# Patient Record
Sex: MALE | Race: WHITE | NOT HISPANIC OR LATINO | Employment: UNEMPLOYED | URBAN - METROPOLITAN AREA
[De-identification: names, ages, dates, MRNs, and addresses within clinical notes are randomized per-mention and may not be internally consistent; named-entity substitution may affect disease eponyms.]

---

## 2019-08-10 ENCOUNTER — HOSPITAL ENCOUNTER (EMERGENCY)
Facility: OTHER | Age: 39
Discharge: HOME | End: 2019-08-10
Attending: EMERGENCY MEDICINE

## 2019-08-10 VITALS
HEIGHT: 76 IN | WEIGHT: 195 LBS | DIASTOLIC BLOOD PRESSURE: 70 MMHG | OXYGEN SATURATION: 98 % | HEART RATE: 68 BPM | RESPIRATION RATE: 16 BRPM | SYSTOLIC BLOOD PRESSURE: 108 MMHG | TEMPERATURE: 100 F | BODY MASS INDEX: 23.75 KG/M2

## 2019-08-10 DIAGNOSIS — R05.9 COUGH: ICD-10-CM

## 2019-08-10 DIAGNOSIS — T40.1X1A ACCIDENTAL OVERDOSE OF HEROIN, INITIAL ENCOUNTER: Primary | ICD-10-CM

## 2019-08-10 LAB
ALBUMIN SERPL BCP-MCNC: 4 G/DL (ref 3.5–5.2)
ALP SERPL-CCNC: 80 U/L (ref 55–135)
ALT SERPL W/O P-5'-P-CCNC: 16 U/L (ref 10–44)
ANION GAP SERPL CALC-SCNC: 10 MMOL/L (ref 8–16)
AST SERPL-CCNC: 29 U/L (ref 10–40)
BASOPHILS # BLD AUTO: 0.05 K/UL (ref 0–0.2)
BASOPHILS NFR BLD: 0.6 % (ref 0–1.9)
BILIRUB SERPL-MCNC: 0.4 MG/DL (ref 0.1–1)
BUN SERPL-MCNC: 17 MG/DL (ref 6–20)
CALCIUM SERPL-MCNC: 9.4 MG/DL (ref 8.7–10.5)
CHLORIDE SERPL-SCNC: 104 MMOL/L (ref 95–110)
CO2 SERPL-SCNC: 27 MMOL/L (ref 23–29)
CREAT SERPL-MCNC: 1 MG/DL (ref 0.5–1.4)
DIFFERENTIAL METHOD: ABNORMAL
EOSINOPHIL # BLD AUTO: 0.7 K/UL (ref 0–0.5)
EOSINOPHIL NFR BLD: 9 % (ref 0–8)
ERYTHROCYTE [DISTWIDTH] IN BLOOD BY AUTOMATED COUNT: 14.2 % (ref 11.5–14.5)
EST. GFR  (AFRICAN AMERICAN): >60 ML/MIN/1.73 M^2
EST. GFR  (NON AFRICAN AMERICAN): >60 ML/MIN/1.73 M^2
GLUCOSE SERPL-MCNC: 86 MG/DL (ref 70–110)
HCT VFR BLD AUTO: 41.3 % (ref 40–54)
HGB BLD-MCNC: 13.6 G/DL (ref 14–18)
IMM GRANULOCYTES # BLD AUTO: 0.02 K/UL (ref 0–0.04)
IMM GRANULOCYTES NFR BLD AUTO: 0.3 % (ref 0–0.5)
LYMPHOCYTES # BLD AUTO: 2.7 K/UL (ref 1–4.8)
LYMPHOCYTES NFR BLD: 33.4 % (ref 18–48)
MCH RBC QN AUTO: 27.7 PG (ref 27–31)
MCHC RBC AUTO-ENTMCNC: 32.9 G/DL (ref 32–36)
MCV RBC AUTO: 84 FL (ref 82–98)
MONOCYTES # BLD AUTO: 0.6 K/UL (ref 0.3–1)
MONOCYTES NFR BLD: 7.4 % (ref 4–15)
NEUTROPHILS # BLD AUTO: 3.9 K/UL (ref 1.8–7.7)
NEUTROPHILS NFR BLD: 49.3 % (ref 38–73)
NRBC BLD-RTO: 0 /100 WBC
PLATELET # BLD AUTO: 362 K/UL (ref 150–350)
PMV BLD AUTO: 8.7 FL (ref 9.2–12.9)
POTASSIUM SERPL-SCNC: 4 MMOL/L (ref 3.5–5.1)
PROT SERPL-MCNC: 6.9 G/DL (ref 6–8.4)
RBC # BLD AUTO: 4.91 M/UL (ref 4.6–6.2)
SODIUM SERPL-SCNC: 141 MMOL/L (ref 136–145)
WBC # BLD AUTO: 7.99 K/UL (ref 3.9–12.7)

## 2019-08-10 PROCEDURE — 96361 HYDRATE IV INFUSION ADD-ON: CPT

## 2019-08-10 PROCEDURE — 99284 EMERGENCY DEPT VISIT MOD MDM: CPT | Mod: 25

## 2019-08-10 PROCEDURE — 63600175 PHARM REV CODE 636 W HCPCS: Performed by: EMERGENCY MEDICINE

## 2019-08-10 PROCEDURE — 85025 COMPLETE CBC W/AUTO DIFF WBC: CPT

## 2019-08-10 PROCEDURE — 96360 HYDRATION IV INFUSION INIT: CPT

## 2019-08-10 PROCEDURE — 80053 COMPREHEN METABOLIC PANEL: CPT

## 2019-08-10 PROCEDURE — 36415 COLL VENOUS BLD VENIPUNCTURE: CPT

## 2019-08-10 RX ORDER — NALOXONE HYDROCHLORIDE 4 MG/.1ML
SPRAY NASAL
Qty: 1 EACH | Refills: 1 | Status: SHIPPED | OUTPATIENT
Start: 2019-08-10 | End: 2021-01-26 | Stop reason: SDUPTHER

## 2019-08-10 RX ADMIN — SODIUM CHLORIDE 1000 ML: 0.9 INJECTION, SOLUTION INTRAVENOUS at 01:08

## 2019-08-10 NOTE — ED TRIAGE NOTES
Pt found unresponsive in car after using heroin. Pt arsouable to verbal stimuli but falls asleep easily. Maintaining airway. Pt reports injection and no inhalation. Pt frequently trying to clear throat.

## 2019-08-10 NOTE — ED PROVIDER NOTES
Encounter Date: 8/10/2019    SCRIBE #1 NOTE: I, Anurag Goss, am scribing for, and in the presence of, Dr. Palacio.       History     Chief Complaint   Patient presents with    Drug Overdose     per EMS, pt used heroine this morning. pt was found unresponsive in car by [facility]. per EMS, pt was aaox3 upon arrival to scene.      Time seen by provider: 12:34 PM    This is a 38 y.o. male w/ hx of long term IVDU who presents via EMS with complaint of AMS s/p heroin overdose. Pt states that he feels fine now. His last memory PTA was halting at a stop sign as he was driving just after using. He notes using usual dosage, but that he has recently developed increased tolerance. He uses daily. He reports associated anxiety, nausea, decreased appetite and congestion. He denies fever and cough. He was in rehab a few years ago. Pt has hx of tobacco use. Denies any other medical issues.     The history is provided by the patient.     Review of patient's allergies indicates:  No Known Allergies  No past medical history on file.  No past surgical history on file.  No family history on file.  Social History     Tobacco Use    Smoking status: Not on file   Substance Use Topics    Alcohol use: Not on file    Drug use: Not on file     Review of Systems   Constitutional: Positive for appetite change. Negative for chills and fever.   HENT: Positive for congestion. Negative for rhinorrhea and sore throat.    Eyes: Negative for visual disturbance.   Respiratory: Negative for cough and shortness of breath.    Cardiovascular: Negative for chest pain.   Gastrointestinal: Positive for nausea. Negative for abdominal pain, diarrhea and vomiting.   Genitourinary: Negative for dysuria.   Musculoskeletal: Negative for back pain.   Skin: Negative for rash.   Neurological: Negative for dizziness, weakness and light-headedness.   Psychiatric/Behavioral: Positive for confusion. The patient is nervous/anxious.        Physical Exam     Initial  Vitals [08/10/19 1127]   BP Pulse Resp Temp SpO2   (!) 124/59 78 16 99.2 °F (37.3 °C) 96 %      MAP       --         Physical Exam    Nursing note and vitals reviewed.  Constitutional: He appears well-developed and well-nourished.   Disheveled and somnolent.   HENT:   Head: Normocephalic and atraumatic.   Mouth/Throat: Oropharynx is clear and moist.   Eyes: Conjunctivae and EOM are normal. Pupils are equal, round, and reactive to light.   Neck: Normal range of motion. Neck supple.   Cardiovascular: Normal rate, regular rhythm, S1 normal, S2 normal and normal heart sounds. Exam reveals no gallop and no friction rub.    No murmur heard.  Pulmonary/Chest: Breath sounds normal. No respiratory distress. He has no wheezes. He has no rhonchi. He has no rales.   Abdominal: Soft. Bowel sounds are normal. There is no tenderness. There is no rebound and no guarding.   Musculoskeletal: Normal range of motion. He exhibits no edema or tenderness.   No lower extremity edema.    Neurological: He is alert and oriented to person, place, and time. He has normal strength. No cranial nerve deficit.   Skin: Skin is warm. Capillary refill takes less than 2 seconds. No rash noted. No pallor.   Left arm IVDU site with no sign of infection.    Psychiatric: He has a normal mood and affect. His behavior is normal. Judgment and thought content normal.         ED Course   Procedures  Labs Reviewed   CBC W/ AUTO DIFFERENTIAL - Abnormal; Notable for the following components:       Result Value    Hemoglobin 13.6 (*)     Platelets 362 (*)     MPV 8.7 (*)     Eos # 0.7 (*)     Eosinophil% 9.0 (*)     All other components within normal limits   COMPREHENSIVE METABOLIC PANEL          Imaging Results          X-Ray Chest AP Portable (Final result)  Result time 08/10/19 14:08:33    Final result by Mati De Oliveira MD (08/10/19 14:08:33)                 Impression:      No acute abnormality.      Electronically signed by: Mati De Oliveira  MD  Date:    08/10/2019  Time:    14:08             Narrative:    EXAMINATION:  XR CHEST AP PORTABLE    CLINICAL HISTORY:  Cough    TECHNIQUE:  Single frontal view of the chest was performed.    COMPARISON:  None    FINDINGS:  Lungs are clear.  No effusion or pneumothorax.    No acute bone abnormality.                                 Medical Decision Making:   History:   Old Medical Records: I decided to obtain old medical records.  Initial Assessment:       38-year-old male with history of IVDU brought by EMS after patient found unresponsive in his car.  He admits to using heroin right before this, and uses daily.  He currently has no complaints, he appears somnolent disheveled on exam but no sign of trauma or other concerning exam findings.  No sign of cellulitis of IVDU sites.  He has normal vitals and afebrile.  Per EMS, no Narcan given.    Given initial AMS, basic labs checked with no acute findings, no anemia or elevated LFTs.  He complained of cough while in ED, so chest x-ray done with no sign of pneumonia.   Patient was observed in ED for 4 hr with no recurrent somnolence and no apnea, and was ambulatory, no longer somnolent and at baseline with no complaints on reassessment.  He was extensively counseled on importance of drug cessation.  Given long-term use, he will likely require inpatient rehab or Suboxone/methadone to handle detox.  He was also given Narcan Rx in case of future overdoses.  Patient comfortable with discharge plan and return precautions.      Clinical Tests:   Lab Tests: Ordered and Reviewed  Radiological Study: Ordered and Reviewed            Scribe Attestation:   Scribe #1: I performed the above scribed service and the documentation accurately describes the services I performed. I attest to the accuracy of the note.    Attending Attestation:           Physician Attestation for Scribe:  Physician Attestation Statement for Scribe #1: I, Dr. Palacio , reviewed documentation, as scribed  by Anurag Goss  in my presence, and it is both accurate and complete.                    Clinical Impression:     1. Accidental overdose of heroin, initial encounter    2. Cough                                   Ambrocio Palacio MD  08/11/19 6076

## 2019-08-10 NOTE — ED NOTES
Pt still appears drowsy. Pt connected to continuous pulse ox and bp monitor. Pt awakens to verbal stimuli. Respirations even/unlabored. Side rails upx2, call bell within reach. Will continue to monitor.

## 2021-01-26 ENCOUNTER — HOSPITAL ENCOUNTER (EMERGENCY)
Facility: OTHER | Age: 41
Discharge: HOME | End: 2021-01-26
Attending: EMERGENCY MEDICINE

## 2021-01-26 VITALS
TEMPERATURE: 98 F | RESPIRATION RATE: 19 BRPM | SYSTOLIC BLOOD PRESSURE: 103 MMHG | WEIGHT: 200 LBS | OXYGEN SATURATION: 95 % | DIASTOLIC BLOOD PRESSURE: 57 MMHG | BODY MASS INDEX: 24.36 KG/M2 | HEIGHT: 76 IN | HEART RATE: 67 BPM

## 2021-01-26 DIAGNOSIS — T40.1X1A HEROIN OVERDOSE, ACCIDENTAL OR UNINTENTIONAL, INITIAL ENCOUNTER: Primary | ICD-10-CM

## 2021-01-26 LAB — POCT GLUCOSE: 106 MG/DL (ref 70–110)

## 2021-01-26 PROCEDURE — 99283 EMERGENCY DEPT VISIT LOW MDM: CPT | Mod: 25

## 2021-01-26 PROCEDURE — 82962 GLUCOSE BLOOD TEST: CPT

## 2021-01-26 RX ORDER — NALOXONE HYDROCHLORIDE 4 MG/.1ML
SPRAY NASAL
Qty: 1 EACH | Refills: 1 | Status: SHIPPED | OUTPATIENT
Start: 2021-01-26

## 2021-09-15 ENCOUNTER — HOSPITAL ENCOUNTER (EMERGENCY)
Facility: HOSPITAL | Age: 41
Discharge: HOME | End: 2021-09-15
Attending: EMERGENCY MEDICINE

## 2021-09-15 VITALS
WEIGHT: 200 LBS | HEART RATE: 97 BPM | HEIGHT: 76 IN | BODY MASS INDEX: 24.36 KG/M2 | RESPIRATION RATE: 18 BRPM | OXYGEN SATURATION: 96 % | TEMPERATURE: 99 F | SYSTOLIC BLOOD PRESSURE: 121 MMHG | DIASTOLIC BLOOD PRESSURE: 88 MMHG

## 2021-09-15 DIAGNOSIS — F41.9 ANXIETY: Primary | ICD-10-CM

## 2021-09-15 DIAGNOSIS — Z76.0 MEDICATION REFILL: ICD-10-CM

## 2021-09-15 PROCEDURE — 25000003 PHARM REV CODE 250: Performed by: EMERGENCY MEDICINE

## 2021-09-15 PROCEDURE — 99284 EMERGENCY DEPT VISIT MOD MDM: CPT

## 2021-09-15 RX ORDER — TRAZODONE HYDROCHLORIDE 50 MG/1
100 TABLET ORAL ONCE
Status: COMPLETED | OUTPATIENT
Start: 2021-09-15 | End: 2021-09-15

## 2021-09-15 RX ORDER — CITALOPRAM 10 MG/1
10 TABLET ORAL DAILY
Qty: 30 TABLET | Refills: 0 | Status: SHIPPED | OUTPATIENT
Start: 2021-09-15 | End: 2022-09-15

## 2021-09-15 RX ORDER — OLANZAPINE 10 MG/1
10 TABLET ORAL NIGHTLY
Qty: 30 TABLET | Refills: 0 | Status: SHIPPED | OUTPATIENT
Start: 2021-09-15 | End: 2022-09-15

## 2021-09-15 RX ORDER — TRAZODONE HYDROCHLORIDE 100 MG/1
100 TABLET ORAL NIGHTLY PRN
Qty: 30 TABLET | Refills: 0 | Status: SHIPPED | OUTPATIENT
Start: 2021-09-15 | End: 2022-09-15

## 2021-09-15 RX ORDER — OLANZAPINE 5 MG/1
10 TABLET ORAL DAILY
Status: DISCONTINUED | OUTPATIENT
Start: 2021-09-16 | End: 2021-09-15

## 2021-09-15 RX ORDER — OLANZAPINE 5 MG/1
10 TABLET, ORALLY DISINTEGRATING ORAL ONCE
Status: COMPLETED | OUTPATIENT
Start: 2021-09-15 | End: 2021-09-15

## 2021-09-15 RX ADMIN — OLANZAPINE 10 MG: 5 TABLET, ORALLY DISINTEGRATING ORAL at 10:09

## 2021-09-15 RX ADMIN — TRAZODONE HYDROCHLORIDE 100 MG: 50 TABLET ORAL at 10:09

## 2024-07-09 ENCOUNTER — HOSPITAL ENCOUNTER (EMERGENCY)
Facility: HOSPITAL | Age: 44
Discharge: LEFT BEFORE DISCHARGE | End: 2024-07-09
Attending: EMERGENCY MEDICINE

## 2024-07-09 VITALS
DIASTOLIC BLOOD PRESSURE: 80 MMHG | HEIGHT: 72 IN | OXYGEN SATURATION: 97 % | RESPIRATION RATE: 20 BRPM | WEIGHT: 175 LBS | HEART RATE: 120 BPM | SYSTOLIC BLOOD PRESSURE: 143 MMHG | TEMPERATURE: 99 F | BODY MASS INDEX: 23.7 KG/M2

## 2024-07-09 DIAGNOSIS — F15.929 METHAMPHETAMINE INTOXICATION: Primary | ICD-10-CM

## 2024-07-09 PROCEDURE — 99283 EMERGENCY DEPT VISIT LOW MDM: CPT

## 2024-07-09 NOTE — ED PROVIDER NOTES
Encounter Date: 7/9/2024       History     Chief Complaint   Patient presents with    Leg Swelling    Possible Snake Bite     Patient brought in by EMS due to telling the clinic that he was bitten by a snake.  Further clarification was that he was seeing his neck.  Patient has a history of methamphetamine use according to history      Review of patient's allergies indicates:  No Known Allergies  No past medical history on file.  No past surgical history on file.  No family history on file.  Social History     Tobacco Use    Smoking status: Former   Substance Use Topics    Alcohol use: Yes     Comment: occ    Drug use: Yes     Types: IV     Comment: heroin     Review of Systems   Psychiatric/Behavioral:  Positive for agitation, behavioral problems and hallucinations.        Physical Exam     Initial Vitals [07/09/24 1410]   BP Pulse Resp Temp SpO2   (!) 143/80 (!) 120 20 99.2 °F (37.3 °C) 97 %      MAP       --         Physical Exam    Constitutional: He appears well-developed and well-nourished. No distress.   HENT:   Head: Normocephalic and atraumatic.   Eyes: Conjunctivae are normal. Pupils are equal, round, and reactive to light.   Cardiovascular:            No JVD   Pulmonary/Chest: No respiratory distress.   Abdominal: He exhibits no distension.     Neurological: He is alert and oriented to person, place, and time.   Skin: No rash noted. No erythema.   Psychiatric:   Agitated.  No active delusions or hallucinations.         Medical Screening Exam   See Full Note    ED Course   Procedures  Labs Reviewed - No data to display       Imaging Results    None          Medications - No data to display  Medical Decision Making             ED Course as of 07/10/24 0657   Tue Jul 09, 2024   1416 Patient seen by physician general exam normal patient had said that he had a snake on him and was sent by clinic.  Patient has capacity mucous own decisions.  He was having some hallucinations.  Has a history of known drug use.   Could not keep patient against his will.  He decided to leave without further workup [PK]      ED Course User Index  [PK] Alistair Begum MD                           Clinical Impression:   Final diagnoses:  [F15.929] Methamphetamine intoxication (Primary)        ED Disposition Condition    Alistair Houser MD  07/10/24 0657

## 2024-07-09 NOTE — ED TRIAGE NOTES
Patient presents to the ED via EMS for possible snake bite. Patient went to [facility] and told them he got bit by a snake so EMS was called. Patient complains of bilateral leg pain.

## 2024-09-21 ENCOUNTER — HOSPITAL ENCOUNTER (EMERGENCY)
Facility: OTHER | Age: 44
Discharge: HOME | End: 2024-09-21
Attending: EMERGENCY MEDICINE

## 2024-09-21 VITALS
HEART RATE: 70 BPM | SYSTOLIC BLOOD PRESSURE: 122 MMHG | BODY MASS INDEX: 26.18 KG/M2 | WEIGHT: 215 LBS | OXYGEN SATURATION: 98 % | RESPIRATION RATE: 16 BRPM | TEMPERATURE: 98 F | HEIGHT: 76 IN | DIASTOLIC BLOOD PRESSURE: 79 MMHG

## 2024-09-21 DIAGNOSIS — K59.00 CONSTIPATION, UNSPECIFIED CONSTIPATION TYPE: ICD-10-CM

## 2024-09-21 DIAGNOSIS — K43.9 HERNIA OF ABDOMINAL WALL: Primary | ICD-10-CM

## 2024-09-21 PROCEDURE — 99282 EMERGENCY DEPT VISIT SF MDM: CPT

## 2024-09-21 PROCEDURE — 25000003 PHARM REV CODE 250: Performed by: EMERGENCY MEDICINE

## 2024-09-21 RX ORDER — ADHESIVE BANDAGE
2400 BANDAGE TOPICAL
Status: COMPLETED | OUTPATIENT
Start: 2024-09-21 | End: 2024-09-21

## 2024-09-21 RX ORDER — ADHESIVE BANDAGE
2400 BANDAGE TOPICAL DAILY PRN
Qty: 473 ML | Refills: 0 | Status: SHIPPED | OUTPATIENT
Start: 2024-09-21

## 2024-09-21 RX ADMIN — MAGNESIUM HYDROXIDE 2400 MG: 400 SUSPENSION ORAL at 10:09

## 2024-09-22 NOTE — DISCHARGE INSTRUCTIONS
Mr. Portillo,    Thank you for letting me care for you today! It was nice meeting you, and I hope you feel better soon.   If you would like access to your chart and what was done today please utilize the [facility].   Please come back to [facility] for all of your future medical needs.    Our goal in the emergency department is to always give you outstanding care and exceptional service. You may receive a survey by mail or e-mail in the next week regarding your experience in our ED. We would greatly appreciate you completing and returning the survey. Your feedback provides us with a way to recognize our staff who give very good care and it helps us learn how to improve when your experience was below our aspiration of excellence.     Sincerely,    Tramaine Medina MD  Board Certified Emergency Physician

## 2024-09-22 NOTE — ED PROVIDER NOTES
Encounter Date: 9/21/2024       History     Chief Complaint   Patient presents with    Abdominal Pain     C/o right lower abdominal pain, Previously diagnosed with abdominal hernia,      This is a pleasant 45 yo man that presents for evaluation of pain in the abdomen in the right lower quadrant which felt like a burning similar to previous issues related to a known inguinal hernia in the past.  He denies any injury, fevers, chills.  Notes that he is currently in a rehabilitation facility and taking Suboxone which tends to make him constipated.  He generally takes milk of magnesia when taking this medication to help with bowel movements but because he is in a rehabilitation facility at this time is not able to obtain it.    The history is provided by the patient and medical records.     Review of patient's allergies indicates:  No Known Allergies  History reviewed. No pertinent past medical history.  History reviewed. No pertinent surgical history.  No family history on file.  Social History     Tobacco Use    Smoking status: Former   Substance Use Topics    Alcohol use: Yes     Comment: occ    Drug use: Yes     Types: IV     Comment: heroin     Review of Systems  Constitutional-no fever  HEENT-no congestion  Eyes-no redness  Respiratory-no shortness of breath  Cardio-no chest pain  GI-positive abdominal pain  Endocrine-no cold intolerance  -no difficulty urinating  MSK-no myalgias  Skin-no rashes  Allergy-no environmental allergy  Neurologic-, no headache  Hematology-no swollen nodes  Behavioral-no confusion  Physical Exam     Initial Vitals [09/21/24 2134]   BP Pulse Resp Temp SpO2   128/69 76 18 98.2 °F (36.8 °C) 97 %      MAP       --         Physical Exam  Constitutional:  Uncomfortable appearing 44-year-old man in mild distress  Eyes: Conjunctivae normal.  ENT       Head: Normocephalic, atraumatic.       Nose: Normal external appearance        Mouth/Throat: no strigulous respirations    Hematological/Lymphatic/Immunilogical: no visible lymphadenopathy   Cardiovascular: Normal rate,   Respiratory: Normal respiratory effort.  Soft, no rebound, no guarding,  Gastrointestinal: non distended   Musculoskeletal: Normal range of motion in all extremities. No obvious deformities or swelling.  Neurologic: Alert, oriented. Normal speech and language. No gross focal neurologic deficits are appreciated.  Skin: Skin is warm, dry. No rash noted.  Psychiatric: Mood and affect are normal.   ED Course   Procedures  Labs Reviewed - No data to display       Imaging Results    None          Medications   magnesium hydroxide 400 mg/5 ml suspension 2,400 mg (2,400 mg Oral Given 9/21/24 2228)     Medical Decision Making  Problems Addressed:  Constipation, unspecified constipation type: acute illness or injury  Hernia of abdominal wall: chronic illness or injury with exacerbation, progression, or side effects of treatment    Amount and/or Complexity of Data Reviewed  External Data Reviewed: labs and notes.     Details: History of hernia as well as opiate abuse and methamphetamine abuse    Risk  OTC drugs.  Prescription drug management.  Decision regarding hospitalization.  Diagnosis or treatment significantly limited by social determinants of health.  Risk Details: Substance abuse complicates this patient's care is a social determinants of health                                      Clinical Impression:  Final diagnoses:  [K43.9] Hernia of abdominal wall (Primary)  [K59.00] Constipation, unspecified constipation type          ED Disposition Condition    Discharge Stable          ED Prescriptions       Medication Sig Dispense Start Date End Date Auth. Provider    magnesium hydroxide 400 mg/5 ml (MILK OF MAGNESIA) 400 mg/5 mL Susp Take 30 mLs (2,400 mg total) by mouth daily as needed (constipation). 473 mL 9/21/2024 -- Tramaine Medina MD          Follow-up Information       Follow up With Specialties Details Why  Contact Info    [facility] - Emergency Dept Emergency Medicine Go to  As needed [address] [facility] [id number] [phone]    Seymour Back Jr., MD General Surgery, Vascular Surgery Schedule an appointment as soon as possible for a visit  As needed [address] [address]  [phone]               Tramaine Medina MD  09/22/24 6216

## 2024-09-23 ENCOUNTER — TELEPHONE (OUTPATIENT)
Dept: ORTHOPEDICS | Facility: CLINIC | Age: 44
End: 2024-09-23

## 2024-12-23 ENCOUNTER — HOSPITAL ENCOUNTER (EMERGENCY)
Facility: HOSPITAL | Age: 44
Discharge: PSYCHIATRIC OR SUBSTANCE USE FACILITY | End: 2024-12-23
Attending: EMERGENCY MEDICINE
Payer: MEDICAID

## 2024-12-23 VITALS
HEART RATE: 87 BPM | TEMPERATURE: 100 F | OXYGEN SATURATION: 97 % | DIASTOLIC BLOOD PRESSURE: 66 MMHG | SYSTOLIC BLOOD PRESSURE: 142 MMHG | RESPIRATION RATE: 19 BRPM

## 2024-12-23 DIAGNOSIS — F19.94 SUBSTANCE INDUCED MOOD DISORDER: ICD-10-CM

## 2024-12-23 DIAGNOSIS — R45.851 SUICIDAL IDEATION: ICD-10-CM

## 2024-12-23 DIAGNOSIS — R00.0 TACHYCARDIA: ICD-10-CM

## 2024-12-23 DIAGNOSIS — F20.0 ACUTE EXACERBATION OF CHRONIC PARANOID SCHIZOPHRENIA: Primary | ICD-10-CM

## 2024-12-23 LAB
ALBUMIN SERPL BCP-MCNC: 4.5 G/DL (ref 3.5–5.2)
ALP SERPL-CCNC: 76 U/L (ref 40–150)
ALT SERPL W/O P-5'-P-CCNC: 13 U/L (ref 10–44)
AMPHET+METHAMPHET UR QL: ABNORMAL
ANION GAP SERPL CALC-SCNC: 14 MMOL/L (ref 8–16)
AST SERPL-CCNC: 21 U/L (ref 10–40)
BACTERIA #/AREA URNS HPF: ABNORMAL /HPF
BARBITURATES UR QL SCN>200 NG/ML: NEGATIVE
BASOPHILS # BLD AUTO: 0.02 K/UL (ref 0–0.2)
BASOPHILS NFR BLD: 0.3 % (ref 0–1.9)
BENZODIAZ UR QL SCN>200 NG/ML: ABNORMAL
BILIRUB SERPL-MCNC: 0.7 MG/DL (ref 0.1–1)
BILIRUB UR QL STRIP: ABNORMAL
BUN SERPL-MCNC: 25 MG/DL (ref 6–20)
BZE UR QL SCN: NEGATIVE
CALCIUM SERPL-MCNC: 10 MG/DL (ref 8.7–10.5)
CANNABINOIDS UR QL SCN: NEGATIVE
CHLORIDE SERPL-SCNC: 107 MMOL/L (ref 95–110)
CK SERPL-CCNC: 313 U/L (ref 20–200)
CLARITY UR: ABNORMAL
CO2 SERPL-SCNC: 20 MMOL/L (ref 23–29)
COLOR UR: YELLOW
CREAT SERPL-MCNC: 1 MG/DL (ref 0.5–1.4)
CREAT UR-MCNC: 280.9 MG/DL (ref 23–375)
DIFFERENTIAL METHOD BLD: ABNORMAL
EOSINOPHIL # BLD AUTO: 0.2 K/UL (ref 0–0.5)
EOSINOPHIL NFR BLD: 2.7 % (ref 0–8)
ERYTHROCYTE [DISTWIDTH] IN BLOOD BY AUTOMATED COUNT: 13.4 % (ref 11.5–14.5)
EST. GFR  (NO RACE VARIABLE): >60 ML/MIN/1.73 M^2
ETHANOL SERPL-MCNC: <10 MG/DL (ref 0–10)
GLUCOSE SERPL-MCNC: 90 MG/DL (ref 70–110)
GLUCOSE UR QL STRIP: NEGATIVE
HCT VFR BLD AUTO: 41.8 % (ref 40–54)
HGB BLD-MCNC: 14.6 G/DL (ref 14–18)
HGB UR QL STRIP: ABNORMAL
HYALINE CASTS #/AREA URNS LPF: 0 /LPF
IMM GRANULOCYTES # BLD AUTO: 0.01 K/UL (ref 0–0.04)
IMM GRANULOCYTES NFR BLD AUTO: 0.2 % (ref 0–0.5)
KETONES UR QL STRIP: ABNORMAL
LEUKOCYTE ESTERASE UR QL STRIP: NEGATIVE
LYMPHOCYTES # BLD AUTO: 2.6 K/UL (ref 1–4.8)
LYMPHOCYTES NFR BLD: 41.9 % (ref 18–48)
MCH RBC QN AUTO: 28.1 PG (ref 27–31)
MCHC RBC AUTO-ENTMCNC: 34.9 G/DL (ref 32–36)
MCV RBC AUTO: 80 FL (ref 82–98)
METHADONE UR QL SCN>300 NG/ML: NEGATIVE
MICROSCOPIC COMMENT: ABNORMAL
MONOCYTES # BLD AUTO: 0.6 K/UL (ref 0.3–1)
MONOCYTES NFR BLD: 9.6 % (ref 4–15)
NEUTROPHILS # BLD AUTO: 2.8 K/UL (ref 1.8–7.7)
NEUTROPHILS NFR BLD: 45.3 % (ref 38–73)
NITRITE UR QL STRIP: NEGATIVE
NRBC BLD-RTO: 0 /100 WBC
OHS QRS DURATION: 94 MS
OHS QRS DURATION: 94 MS
OHS QTC CALCULATION: 445 MS
OHS QTC CALCULATION: 476 MS
OPIATES UR QL SCN: NEGATIVE
OTHER ELEMENTS URNS MICRO: ABNORMAL
PCP UR QL SCN>25 NG/ML: NEGATIVE
PH UR STRIP: 6 [PH] (ref 5–8)
PLATELET # BLD AUTO: 311 K/UL (ref 150–450)
PMV BLD AUTO: 9 FL (ref 9.2–12.9)
POTASSIUM SERPL-SCNC: 4 MMOL/L (ref 3.5–5.1)
PROT SERPL-MCNC: 7.5 G/DL (ref 6–8.4)
PROT UR QL STRIP: ABNORMAL
RBC # BLD AUTO: 5.2 M/UL (ref 4.6–6.2)
RBC #/AREA URNS HPF: 10 /HPF (ref 0–4)
SARS-COV-2 RDRP RESP QL NAA+PROBE: NEGATIVE
SODIUM SERPL-SCNC: 141 MMOL/L (ref 136–145)
SP GR UR STRIP: >=1.03 (ref 1–1.03)
TOXICOLOGY INFORMATION: ABNORMAL
TSH SERPL DL<=0.005 MIU/L-ACNC: 1.85 UIU/ML (ref 0.4–4)
URN SPEC COLLECT METH UR: ABNORMAL
UROBILINOGEN UR STRIP-ACNC: NEGATIVE EU/DL
WBC # BLD AUTO: 6.23 K/UL (ref 3.9–12.7)
WBC #/AREA URNS HPF: 15 /HPF (ref 0–5)

## 2024-12-23 PROCEDURE — 80053 COMPREHEN METABOLIC PANEL: CPT | Performed by: EMERGENCY MEDICINE

## 2024-12-23 PROCEDURE — 84443 ASSAY THYROID STIM HORMONE: CPT | Performed by: EMERGENCY MEDICINE

## 2024-12-23 PROCEDURE — 63600175 PHARM REV CODE 636 W HCPCS

## 2024-12-23 PROCEDURE — 93005 ELECTROCARDIOGRAM TRACING: CPT

## 2024-12-23 PROCEDURE — 25000003 PHARM REV CODE 250: Performed by: STUDENT IN AN ORGANIZED HEALTH CARE EDUCATION/TRAINING PROGRAM

## 2024-12-23 PROCEDURE — 96361 HYDRATE IV INFUSION ADD-ON: CPT

## 2024-12-23 PROCEDURE — 80307 DRUG TEST PRSMV CHEM ANLYZR: CPT | Performed by: EMERGENCY MEDICINE

## 2024-12-23 PROCEDURE — 93010 ELECTROCARDIOGRAM REPORT: CPT | Mod: ,,, | Performed by: INTERNAL MEDICINE

## 2024-12-23 PROCEDURE — 87635 SARS-COV-2 COVID-19 AMP PRB: CPT | Performed by: STUDENT IN AN ORGANIZED HEALTH CARE EDUCATION/TRAINING PROGRAM

## 2024-12-23 PROCEDURE — 82550 ASSAY OF CK (CPK): CPT | Performed by: EMERGENCY MEDICINE

## 2024-12-23 PROCEDURE — 85025 COMPLETE CBC W/AUTO DIFF WBC: CPT | Performed by: EMERGENCY MEDICINE

## 2024-12-23 PROCEDURE — 82077 ASSAY SPEC XCP UR&BREATH IA: CPT | Performed by: EMERGENCY MEDICINE

## 2024-12-23 PROCEDURE — 96375 TX/PRO/DX INJ NEW DRUG ADDON: CPT

## 2024-12-23 PROCEDURE — 87086 URINE CULTURE/COLONY COUNT: CPT | Performed by: EMERGENCY MEDICINE

## 2024-12-23 PROCEDURE — 99285 EMERGENCY DEPT VISIT HI MDM: CPT | Mod: 25

## 2024-12-23 PROCEDURE — 96374 THER/PROPH/DIAG INJ IV PUSH: CPT

## 2024-12-23 PROCEDURE — 94761 N-INVAS EAR/PLS OXIMETRY MLT: CPT

## 2024-12-23 PROCEDURE — 63600175 PHARM REV CODE 636 W HCPCS: Performed by: EMERGENCY MEDICINE

## 2024-12-23 PROCEDURE — 81000 URINALYSIS NONAUTO W/SCOPE: CPT | Mod: 59 | Performed by: EMERGENCY MEDICINE

## 2024-12-23 RX ORDER — LIDOCAINE HYDROCHLORIDE 20 MG/ML
JELLY TOPICAL
Status: COMPLETED | OUTPATIENT
Start: 2024-12-23 | End: 2024-12-23

## 2024-12-23 RX ORDER — HALOPERIDOL 5 MG/ML
INJECTION INTRAMUSCULAR
Status: COMPLETED
Start: 2024-12-23 | End: 2024-12-23

## 2024-12-23 RX ORDER — LORAZEPAM 2 MG/ML
INJECTION INTRAMUSCULAR
Status: DISPENSED
Start: 2024-12-23 | End: 2024-12-23

## 2024-12-23 RX ORDER — HALOPERIDOL 5 MG/ML
2 INJECTION INTRAMUSCULAR
Status: COMPLETED | OUTPATIENT
Start: 2024-12-23 | End: 2024-12-23

## 2024-12-23 RX ORDER — TAMSULOSIN HYDROCHLORIDE 0.4 MG/1
0.4 CAPSULE ORAL
Status: COMPLETED | OUTPATIENT
Start: 2024-12-23 | End: 2024-12-23

## 2024-12-23 RX ADMIN — HALOPERIDOL 2 MG: 5 INJECTION INTRAMUSCULAR at 04:12

## 2024-12-23 RX ADMIN — LORAZEPAM 2 MG: 2 INJECTION INTRAMUSCULAR; INTRAVENOUS at 04:12

## 2024-12-23 RX ADMIN — SODIUM CHLORIDE, POTASSIUM CHLORIDE, SODIUM LACTATE AND CALCIUM CHLORIDE 1000 ML: 600; 310; 30; 20 INJECTION, SOLUTION INTRAVENOUS at 04:12

## 2024-12-23 RX ADMIN — SODIUM CHLORIDE 1000 ML: 9 INJECTION, SOLUTION INTRAVENOUS at 07:12

## 2024-12-23 RX ADMIN — HALOPERIDOL LACTATE 2 MG: 5 INJECTION, SOLUTION INTRAMUSCULAR at 04:12

## 2024-12-23 RX ADMIN — LIDOCAINE HYDROCHLORIDE 6 ML: 20 JELLY TOPICAL at 09:12

## 2024-12-23 RX ADMIN — TAMSULOSIN HYDROCHLORIDE 0.4 MG: 0.4 CAPSULE ORAL at 12:12

## 2024-12-23 NOTE — ED NOTES
Rnx2 attempted cath for urine without success. Second attempted yielded blood. Cath removed MD notified

## 2024-12-23 NOTE — ED NOTES
[facility] here to transport patient.  Patient still unable to urinate.  Advised that we will have to call back when patient is ready to go.

## 2024-12-23 NOTE — PLAN OF CARE
met with patient at bedside he ws not capable of being still for long. Pt was able to communicate andhe stated he just came to get himself right. When asked if he sees anything or hears anything that I might not can see or hear; he dosed off and on during conversation. SW introduced herself and asked patient how she could help. He said he wants to just go home and sleep. When asked if he wanted to hurt himself, he stated no when asked if he wanted to hurt the police or anyone he stated no! When VICTORINA spoke with head nurse and asked if patient was on a hold he stated he was not the patient is waiting to go to [facility] but needed a catheter therefore he needs to pee on his own before being discharged. According to Adeel, Patients mother called and asked for patient to be committed. Adeel stated that he was able to share with her for her to go to [facility] and file for commitment. She stated she would after her daughter gets off of work.

## 2024-12-23 NOTE — ED PROVIDER NOTES
History     Chief Complaint   Patient presents with    Drug Overdose     Spice and methamphetamines     HPI:  Yared Portillo is a 44 y.o. male with PMH as below who presents to the [facility] emergency department for evaluation of bizarre/paranoid behavior, thinking others are going to kill him. He told family he wanted to kill himself and to die by being shot by . He's been using K2/spice and amphetamines. Patient has a history of schizophrenia.     PCP: No, Primary Doctor    Review of patient's allergies indicates:  No Known Allergies   No past medical history on file.  No past surgical history on file.    No family history on file.  Social History     Tobacco Use    Smoking status: Former    Smokeless tobacco: Not on file   Substance and Sexual Activity    Alcohol use: Yes     Comment: occ    Drug use: Yes     Types: IV     Comment: heroin    Sexual activity: Not on file      Review of Systems     Review of Systems   Constitutional: Negative.    HENT: Negative.     Eyes: Negative.    Respiratory: Negative.     Cardiovascular: Negative.    Gastrointestinal: Negative.    Endocrine: Negative.    Genitourinary: Negative.    Musculoskeletal: Negative.    Skin: Negative.    Allergic/Immunologic: Negative.    Neurological: Negative.    Hematological: Negative.    Psychiatric/Behavioral:  Positive for suicidal ideas.    All other systems reviewed and are negative.       Physical Exam     Initial Vitals   BP Pulse Resp Temp SpO2   12/23/24 0415 12/23/24 0415 12/23/24 0439 12/23/24 0444 12/23/24 0415   133/88 (!) 116 16 99.8 °F (37.7 °C) (!) 91 %      MAP       --                 Nursing notes and vital signs reviewed.  Constitutional: Patient is in moderate distress. Shouting.   Head: Normocephalic. Atraumatic.   Eyes:  Conjunctivae are not pale. No scleral icterus.   ENT: Mucous membranes moist.   Neck: Supple.   Cardiovascular: Tachycardic. Regular rhythm.   Pulmonary: No respiratory distress.   Abdominal:  Nutrition Care Plan    Nutrition Diagnosis:   Inadequate intake related to decreased appetite acute on chronic CHF exacerbation as evidenced by patient report of eating 1 meal per day for about 1 month.  Food-and-nutrition-related knowledge deficit  related to low sodium diet as evidenced by patient report of eating seasoned salt, packaged foods at home.    Intervention:  General/healthful diet:   Continue 2 Gram Sodium, 1500 mL Fluid Restricted Diet   · Encouraged intakes    Purpose of the nutrition education:     · Provided nutrition education focused on 2 gm sodium diet, sources of sodium, meal preparation, label reading and eating away from home. Low sodium handout added to AVS    Monitoring and Evaluation:  Amount of food:   Goal for patient to consume 75% or greater of meals    Area(s) and level of knowledge:   Patient presented with a basic level of knowledge following education    Non-distended.   Musculoskeletal: Moves all extremities. No obvious deformities.   Skin: Warm and dry.   Neurological:  Alert. Pressured speech. No acute lateralizing neurologic deficits appreciated.   Psychiatric: Labile. +SI. No HI/AVH. Paranoid. Uncooperative.      ED Course   Procedures  Vitals:    12/23/24 0415 12/23/24 0416 12/23/24 0437 12/23/24 0439   BP: 133/88  128/82    Pulse: (!) 116 (!) 130 (!) 122    Resp:    16   Temp:       TempSrc:       SpO2: (!) 91% 98% (!) 94%     12/23/24 0444 12/23/24 0447   BP:  115/67   Pulse:  94   Resp:  12   Temp: 99.8 °F (37.7 °C)    TempSrc: Oral    SpO2:  (!) 90%     Lab Results Interpreted as Abnormal:  Labs Reviewed   CK - Abnormal       Result Value     (*)    CBC W/ AUTO DIFFERENTIAL - Abnormal    WBC 6.23      RBC 5.20      Hemoglobin 14.6      Hematocrit 41.8      MCV 80 (*)     MCH 28.1      MCHC 34.9      RDW 13.4      Platelets 311      MPV 9.0 (*)     Immature Granulocytes 0.2      Gran # (ANC) 2.8      Immature Grans (Abs) 0.01      Lymph # 2.6      Mono # 0.6      Eos # 0.2      Baso # 0.02      nRBC 0      Gran % 45.3      Lymph % 41.9      Mono % 9.6      Eosinophil % 2.7      Basophil % 0.3      Differential Method Automated     COMPREHENSIVE METABOLIC PANEL - Abnormal    Sodium 141      Potassium 4.0      Chloride 107      CO2 20 (*)     Glucose 90      BUN 25 (*)     Creatinine 1.0      Calcium 10.0      Total Protein 7.5      Albumin 4.5      Total Bilirubin 0.7      Alkaline Phosphatase 76      AST 21      ALT 13      eGFR >60.0      Anion Gap 14     ALCOHOL,MEDICAL (ETHANOL)    Alcohol, Serum <10     TSH   URINALYSIS, REFLEX TO URINE CULTURE   DRUG SCREEN PANEL, URINE EMERGENCY      All Lab Results:  Results for orders placed or performed during the hospital encounter of 12/23/24   CPK    Collection Time: 12/23/24  4:25 AM   Result Value Ref Range     (H) 20 - 200 U/L   CBC auto differential    Collection Time: 12/23/24  4:25 AM    Result Value Ref Range    WBC 6.23 3.90 - 12.70 K/uL    RBC 5.20 4.60 - 6.20 M/uL    Hemoglobin 14.6 14.0 - 18.0 g/dL    Hematocrit 41.8 40.0 - 54.0 %    MCV 80 (L) 82 - 98 fL    MCH 28.1 27.0 - 31.0 pg    MCHC 34.9 32.0 - 36.0 g/dL    RDW 13.4 11.5 - 14.5 %    Platelets 311 150 - 450 K/uL    MPV 9.0 (L) 9.2 - 12.9 fL    Immature Granulocytes 0.2 0.0 - 0.5 %    Gran # (ANC) 2.8 1.8 - 7.7 K/uL    Immature Grans (Abs) 0.01 0.00 - 0.04 K/uL    Lymph # 2.6 1.0 - 4.8 K/uL    Mono # 0.6 0.3 - 1.0 K/uL    Eos # 0.2 0.0 - 0.5 K/uL    Baso # 0.02 0.00 - 0.20 K/uL    nRBC 0 0 /100 WBC    Gran % 45.3 38.0 - 73.0 %    Lymph % 41.9 18.0 - 48.0 %    Mono % 9.6 4.0 - 15.0 %    Eosinophil % 2.7 0.0 - 8.0 %    Basophil % 0.3 0.0 - 1.9 %    Differential Method Automated    Comprehensive metabolic panel    Collection Time: 12/23/24  4:25 AM   Result Value Ref Range    Sodium 141 136 - 145 mmol/L    Potassium 4.0 3.5 - 5.1 mmol/L    Chloride 107 95 - 110 mmol/L    CO2 20 (L) 23 - 29 mmol/L    Glucose 90 70 - 110 mg/dL    BUN 25 (H) 6 - 20 mg/dL    Creatinine 1.0 0.5 - 1.4 mg/dL    Calcium 10.0 8.7 - 10.5 mg/dL    Total Protein 7.5 6.0 - 8.4 g/dL    Albumin 4.5 3.5 - 5.2 g/dL    Total Bilirubin 0.7 0.1 - 1.0 mg/dL    Alkaline Phosphatase 76 40 - 150 U/L    AST 21 10 - 40 U/L    ALT 13 10 - 44 U/L    eGFR >60.0 >60 mL/min/1.73 m^2    Anion Gap 14 8 - 16 mmol/L   Ethanol    Collection Time: 12/23/24  4:25 AM   Result Value Ref Range    Alcohol, Serum <10 0 - 10 mg/dL     Imaging Results    None          The emergency physician reviewed the vital signs and test results, which are outlined above.     ED Discussion      72 hour hold for SI and grave disability s/t schizophrenia exacerbation with psychosis.     Medically cleared for psychiatric care.        ED Medication(s) Administered:  Medications   lactated ringers bolus 1,000 mL (1,000 mLs Intravenous New Bag 12/23/24 1562)   LORazepam (ATIVAN) 2 mg/mL injection (  Not Given 12/23/24  6910)   LORazepam (ATIVAN) injection 2 mg (2 mg Intravenous Given 12/23/24 4196)   haloperidol lactate injection 2 mg (2 mg Intravenous Given 12/23/24 2835)       Prescription Management: I performed a review of the patient's current Rx medication list as input by nursing staff.    Patient's Medications   New Prescriptions    No medications on file   Previous Medications    CITALOPRAM (CELEXA) 10 MG TABLET    Take 1 tablet (10 mg total) by mouth once daily.    MAGNESIUM HYDROXIDE 400 MG/5 ML (MILK OF MAGNESIA) 400 MG/5 ML SUSP    Take 30 mLs (2,400 mg total) by mouth daily as needed (constipation).    NALOXONE (NARCAN) 4 MG/ACTUATION SPRY    4mg by nasal route as needed for opioid overdose; may repeat every 2-3 minutes in alternating nostrils until medical help arrives. Call 911    OLANZAPINE (ZYPREXA) 10 MG TABLET    Take 1 tablet (10 mg total) by mouth every evening.    TRAZODONE (DESYREL) 100 MG TABLET    Take 1 tablet (100 mg total) by mouth nightly as needed for Insomnia.   Modified Medications    No medications on file   Discontinued Medications    No medications on file           Clinical Impression       ICD-10-CM ICD-9-CM   1. Acute exacerbation of chronic paranoid schizophrenia  F20.0 295.34   2. Tachycardia  R00.0 785.0   3. Substance induced mood disorder  F19.94 292.84      ED Disposition Condition    Transfer to Psych Facility Matheus Ellis MD  12/23/24 6310

## 2024-12-24 LAB — BACTERIA UR CULT: NO GROWTH

## 2025-05-01 ENCOUNTER — TELEPHONE (OUTPATIENT)
Dept: PSYCHIATRY | Facility: CLINIC | Age: 45
End: 2025-05-01
Payer: MEDICAID

## 2025-05-02 ENCOUNTER — TELEPHONE (OUTPATIENT)
Dept: PSYCHIATRY | Facility: CLINIC | Age: 45
End: 2025-05-02
Payer: MEDICAID

## 2025-05-02 NOTE — TELEPHONE ENCOUNTER
Rec'd VM at 1011. Pt is requesting to schedule an appt for medication management. Attempted to call. VM not set up.

## 2025-05-09 ENCOUNTER — TELEPHONE (OUTPATIENT)
Dept: PSYCHIATRY | Facility: CLINIC | Age: 45
End: 2025-05-09
Payer: MEDICAID

## 2025-05-09 NOTE — TELEPHONE ENCOUNTER
Patient: Martha Forrest Date: 2022   : 1954  Attending: Sydnee Cabral MD   67 year old female      WOUND CARE INITIAL VISIT      Seen patient in consultation at the request of Anil Zamudio DO    Date of Service: 22    Chief Complaint:   Chief Complaint   Patient presents with   • Wound     This is a 67-year-old female comes in with bilateral lower extremity wounds which are not healing.  Patient has a history of rheumatoid disease.  Patient has an infusion of Remicade today.  She states that she has bilateral lower extremity wounds from a traumatic accident by hitting a bag.  These wounds are now over 4 to 6 weeks old and nonhealing.  She could not get a referral to the wound center earlier.  Please note that patient has been known to us from the past.  She has no fevers chills nausea vomiting diarrhea.  She has been using Medihoney which has been burning.  Her left lower extremity is much better pain at around 4 on 10 but her right lower extremity pain is 6 on 10.  Her right lower extremity is also larger wound.  It is draining quite a bit.  She has a wedding to attend this weekend.  She has been wearing the Tubigrip's    5/3  Patient is here for follow-up on her bilateral lower extremity wounds.  She has no other complaints except that the right leg is hurting a lot more.  It hurts on her shin.  This is a new type of pain.  She has been using Hydrofera Blue.  No other fevers chills nausea vomiting or diarrhea.  No falls.  Her Zyvox was completed last week and she follows with infectious disease.    5/10  Patient is here for follow-up on her bilateral lower extremity wounds.  She has no other complaints except that the right leg is hurting a lot more than last week. She is on morphine and oral gabapentin. Xray reviewed normal.  It hurts on her shin.    She has been using silvedene.  No other fevers chills nausea vomiting or diarrhea.  No falls.  Her Zyvox was completed. Pain 8/10  Rec'd VM at 1236 from a female family member. She states pt wants to schedule a new pt appt with Dr. Archibald.   Before scheduling, make sure it is appropriate for Dr. Archibald as pt previously called for med management, which we won't be able to schedule since he has Medicaid.   You can call the following numbers:   [phone]       5/17   Patient is here for follow-up on her bilateral lower extremity wounds.  She has no other complaints except that the right leg is hurting a lot more than last week. She is waking up at 3 am every day.  She is on morphine and oral gabapentin. Xray reviewed normal.  It hurts on her shin.    She has been using hydrofera blue.  No other fevers chills nausea vomiting or diarrhea.  No falls.  Her Zyvox was completed. Pain 10/10      History of Present Illness: Wound drainage      Current Treatment Regimen: Blue    Pertinent Reviewed: Allergies, Medication, Past Medical history, Surgical history, Social history and Family history     Past Medical History:   Diagnosis Date   • Anemia 7/24/2017   • Arthritis    • Bronchitis    • Chronic pain disorder    • COPD (chronic obstructive pulmonary disease) (CMS/HCC)    • Essential (primary) hypertension    • Mixed hyperlipidemia 6/2/2017   • RAD (reactive airway disease)    • Thyroid condition      Past Surgical History:   Procedure Laterality Date   • Back surgery     • Fracture surgery      right shoulder and wrist fracture and repair  1/2 months ago   • Hysterectomy     • Oophorectomy N/A 1976     Family History   Problem Relation Age of Onset   • Cancer Mother    • Early death Mother    • COPD Father    • Hearing Loss Father    • Hypertension Father        Social History:   Social History     Substance and Sexual Activity   Alcohol Use Never     History   Drug Use Unknown     Social History     Tobacco Use   Smoking Status Never Smoker   Smokeless Tobacco Never Used       (Not in a hospital admission)        Review of Systems: A comprehensive 14 point review of systems was negative.    Physical Exam:  Visit Vitals  Temp 96.3 °F (35.7 °C) (Temporal)       General appearance: Alert and well-developed  Head:   normocephalic without obvious abnormality  Pulmonary: normal respiratory effort  Cardiac: Pulses:  Dorsalis pedis  Bilateral  Present 2+  Abdomen: bowel sounds  normal  Neurologic:  normal  Extremities: Negative Homans signs  Musculoskeletal: Upper and lower extremities symmetric with equal strength 5/5 bilaterally. Freely moving all extremities.  No joint deformities.  Spine with normal curvature.  Skin: normal  Ulcer and Wound:   Wounds are present in bilateral lower extremities.  The right lower extremity wound has a large amount of slough at the base of the wound with few necrotic areas. Mild warmth to area. Mild odor. Mild tenderness around the wound. No calf tenderness.  The periwound has some erythema there is no maceration there is no odor.  The left lower extremity wound also has 100% slough.  The periwound is less inflamed.  There is no maceration.  Bilateral lower extremities plus 2+ edema.    Wound Measurements    Wound Leg Left Lateral Traumatic (Active)   Wound Length (cm) 2 cm 05/17/22 1003   Wound Width (cm) 1.5 cm 05/17/22 1003   Wound Depth (cm) 0.2 cm 05/17/22 1003   Wound Surface Area (cm^2) 3 cm^2 05/17/22 1003   Wound Volume (cm^3) 0.6 cm^3 05/17/22 1003   Number of days: 32       Wound Leg Right Anterior Traumatic (Active)   Wound Length (cm) 4.5 cm 05/17/22 1003   Wound Width (cm) 4 cm 05/17/22 1003   Wound Depth (cm) 0.1 cm 05/17/22 1003   Wound Surface Area (cm^2) 18 cm^2 05/17/22 1003   Wound Volume (cm^3) 1.8 cm^3 05/17/22 1003   Number of days: 32       Laboratory assessments:   No visits with results within 4 Week(s) from this visit.   Latest known visit with results is:   No results found for any previous visit.         Diagnostic assessments reviewed :   Reviewed arterials that were done couple years ago        No orders to display        No results found.       Procedure:NONE     Wound Leg Left Lateral Traumatic (Active)   Wound Length (cm) 2 cm 05/17/22 1003   Wound Width (cm) 1.5 cm 05/17/22 1003   Wound Depth (cm) 0.2 cm 05/17/22 1003   Wound Surface Area (cm^2) 3 cm^2 05/17/22 1003   Wound Volume (cm^3) 0.6 cm^3 05/17/22 1003   Number of  days: 32       Wound Leg Right Anterior Traumatic (Active)   Wound Length (cm) 4.5 cm 05/17/22 1003   Wound Width (cm) 4 cm 05/17/22 1003   Wound Depth (cm) 0.1 cm 05/17/22 1003   Wound Surface Area (cm^2) 18 cm^2 05/17/22 1003   Wound Volume (cm^3) 1.8 cm^3 05/17/22 1003   Number of days: 32       Procedure was Performed by: Sydnee Cabral MD     Diagnoses:   (L03.119,  L02.419) Cellulitis and abscess of leg    (L97.922) Ulcer of left lower extremity with fat layer exposed (CMS/HCC)    (L97.912) Ulcer of right lower extremity with fat layer exposed (CMS/HCC)      Plan: patient sent to ER for severe pain in RLE 5/17/2022      1. bilateral lower extremity wound - severe pain. I had ordered a CT of leg but she likely has something else going on. Since she is waking up in the middle of the night w severe pain she needs her investigation done asap. Called the ER. Diff diag is  Extensive but includes - rule out deep infection. She had normal arterials and is on warfarin. Doubt acute occlusion but possibly needs study to rule this out w night pain. Called ER gave report.     Spent > 40  min in discussing this with patient and coordinating care/docmeting in chart. All questions were answered.      Sydnee Cabral MD   5/17/2022

## 2025-05-12 NOTE — TELEPHONE ENCOUNTER
Not necessary to contact this pt. I spoke with him this morning and scheduled a new pt appt with Dr. Archibald.

## 2025-05-29 ENCOUNTER — OFFICE VISIT (OUTPATIENT)
Facility: CLINIC | Age: 45
End: 2025-05-29
Payer: MEDICAID

## 2025-05-29 VITALS
DIASTOLIC BLOOD PRESSURE: 94 MMHG | BODY MASS INDEX: 27.83 KG/M2 | WEIGHT: 228.5 LBS | SYSTOLIC BLOOD PRESSURE: 141 MMHG | HEART RATE: 71 BPM | HEIGHT: 76 IN

## 2025-05-29 DIAGNOSIS — G47.00 INSOMNIA, UNSPECIFIED TYPE: ICD-10-CM

## 2025-05-29 DIAGNOSIS — F29 PSYCHOSIS, UNSPECIFIED PSYCHOSIS TYPE: ICD-10-CM

## 2025-05-29 DIAGNOSIS — F15.20 METHAMPHETAMINE USE DISORDER, MODERATE: ICD-10-CM

## 2025-05-29 DIAGNOSIS — M79.2 NEUROPATHIC PAIN: ICD-10-CM

## 2025-05-29 DIAGNOSIS — Z79.899 ENCOUNTER FOR LONG-TERM (CURRENT) USE OF MEDICATIONS: ICD-10-CM

## 2025-05-29 DIAGNOSIS — N52.9 ERECTILE DYSFUNCTION, UNSPECIFIED ERECTILE DYSFUNCTION TYPE: ICD-10-CM

## 2025-05-29 DIAGNOSIS — F39 MOOD DISORDER: ICD-10-CM

## 2025-05-29 DIAGNOSIS — F11.20 OPIOID USE DISORDER, SEVERE, ON MAINTENANCE THERAPY: Primary | ICD-10-CM

## 2025-05-29 PROCEDURE — 1160F RVW MEDS BY RX/DR IN RCRD: CPT | Mod: CPTII,,, | Performed by: NEUROMUSCULOSKELETAL MEDICINE & OMM

## 2025-05-29 PROCEDURE — 99999 PR PBB SHADOW E&M-EST. PATIENT-LVL III: CPT | Mod: PBBFAC,,, | Performed by: NEUROMUSCULOSKELETAL MEDICINE & OMM

## 2025-05-29 PROCEDURE — G2211 COMPLEX E/M VISIT ADD ON: HCPCS | Mod: S$PBB,,, | Performed by: NEUROMUSCULOSKELETAL MEDICINE & OMM

## 2025-05-29 PROCEDURE — 99204 OFFICE O/P NEW MOD 45 MIN: CPT | Mod: S$PBB,,, | Performed by: NEUROMUSCULOSKELETAL MEDICINE & OMM

## 2025-05-29 PROCEDURE — 1159F MED LIST DOCD IN RCRD: CPT | Mod: CPTII,,, | Performed by: NEUROMUSCULOSKELETAL MEDICINE & OMM

## 2025-05-29 PROCEDURE — 99213 OFFICE O/P EST LOW 20 MIN: CPT | Mod: PBBFAC,PN | Performed by: NEUROMUSCULOSKELETAL MEDICINE & OMM

## 2025-05-29 PROCEDURE — 3044F HG A1C LEVEL LT 7.0%: CPT | Mod: CPTII,,, | Performed by: NEUROMUSCULOSKELETAL MEDICINE & OMM

## 2025-05-29 PROCEDURE — 3008F BODY MASS INDEX DOCD: CPT | Mod: CPTII,,, | Performed by: NEUROMUSCULOSKELETAL MEDICINE & OMM

## 2025-05-29 PROCEDURE — 3080F DIAST BP >= 90 MM HG: CPT | Mod: CPTII,,, | Performed by: NEUROMUSCULOSKELETAL MEDICINE & OMM

## 2025-05-29 PROCEDURE — 3077F SYST BP >= 140 MM HG: CPT | Mod: CPTII,,, | Performed by: NEUROMUSCULOSKELETAL MEDICINE & OMM

## 2025-05-29 RX ORDER — GABAPENTIN 600 MG/1
TABLET ORAL
COMMUNITY
Start: 2024-09-01 | End: 2025-05-29 | Stop reason: SDUPTHER

## 2025-05-29 RX ORDER — NALOXONE HYDROCHLORIDE 4 MG/.1ML
1 SPRAY NASAL ONCE
Qty: 2 EACH | Refills: 0 | Status: SHIPPED | OUTPATIENT
Start: 2025-05-29 | End: 2025-05-29

## 2025-05-29 RX ORDER — LITHIUM CARBONATE 600 MG/1
600 CAPSULE ORAL
COMMUNITY

## 2025-05-29 RX ORDER — OLANZAPINE 10 MG/1
10 TABLET, FILM COATED ORAL NIGHTLY
Qty: 30 TABLET | Refills: 0 | Status: SHIPPED | OUTPATIENT
Start: 2025-05-29 | End: 2026-05-29

## 2025-05-29 RX ORDER — SILDENAFIL 100 MG/1
100 TABLET, FILM COATED ORAL DAILY PRN
Qty: 30 TABLET | Refills: 0 | Status: SHIPPED | OUTPATIENT
Start: 2025-05-29 | End: 2026-05-29

## 2025-05-29 RX ORDER — BUPRENORPHINE HYDROCHLORIDE 8 MG/1
8 TABLET SUBLINGUAL 3 TIMES DAILY
Qty: 84 TABLET | Refills: 0 | Status: SHIPPED | OUTPATIENT
Start: 2025-05-29 | End: 2025-06-26

## 2025-05-29 RX ORDER — QUETIAPINE FUMARATE 25 MG/1
25 TABLET, FILM COATED ORAL NIGHTLY
COMMUNITY
Start: 2025-04-17

## 2025-05-29 RX ORDER — BUPRENORPHINE HYDROCHLORIDE 8 MG/1
8 TABLET SUBLINGUAL 2 TIMES DAILY
COMMUNITY
Start: 2014-05-01 | End: 2025-05-29

## 2025-05-29 RX ORDER — GABAPENTIN 600 MG/1
600 TABLET ORAL 2 TIMES DAILY
Qty: 60 TABLET | Refills: 0 | Status: SHIPPED | OUTPATIENT
Start: 2025-05-29 | End: 2025-06-28

## 2025-05-29 RX ORDER — QUETIAPINE FUMARATE 50 MG/1
50 TABLET, FILM COATED ORAL NIGHTLY
Qty: 30 TABLET | Refills: 11 | Status: SHIPPED | OUTPATIENT
Start: 2025-05-29 | End: 2026-05-29

## 2025-05-29 NOTE — PROGRESS NOTES
"     Addiction Medicine  NEW PATIENT EVALUATION    Date of Service: 2025    Name:  Yared Portillo   MRN:  [id number]   :  1980   Address:  [address] [place] [address]   Phone:  [phone]      Patient referral by:  [facility]  No address on file    Subjective     2025     History of Present Illness    Patient presents today to establish care for substance use disorder. He reports lifelong substance use starting as a teenager, including heroin and methamphetamine use. Last methamphetamine use was approximately one week ago. He has had multiple rehabilitation stays throughout life, with most recent 30-day stay at [facility] in 2024. He experiences psychosis with heavy methamphetamine use, including auditory hallucinations during periods of use. He denies current auditory hallucinations and any prior diagnosis of schizophrenia or schizoaffective disorder. He has been without Subutex for approximately one month and reports borrowing medication from others. He reports continued cravings despite previous dose of Subutex 16 mg daily. He takes Zyprexa 10 mg intermittently at night and Seroquel regularly at night. He has been prescribed lithium in the past but doesn't take it due to side effects. He also takes gabapentin 600 mg BID for neuropathic pain related to inguinal hernia. He missed a scheduled appointment for hernia repair and hopes to reschedule this in the future. He has history of treated and cleared Hepatitis C from several years ago. He reports erectile dysfunction secondary to prolonged opioid use and Subutex. He lives with his mother. He is currently unemployed but has a couple of prospects. He does not have a PCP. He reports stable mood recently and no psychotic symptoms.            Objective   Objective     Vitals:    25 0956   BP: (!) 141/94   Pulse: 71   Weight: 103.7 kg (228 lb 8.1 oz)   Height: 6' 4" (1.93 m)        Physical Exam:  No acute distress. " Normal appearance. No scleral icterus. Conjunctivae normal. Pulmonary effort is normal. Nor respiratory distress. Alert and oriented x 3. Attention and perception appear normal. Mood and affect appear normal. Speech normal. Patient is cooperative.       The PDMP was checked during today's encounter and recent history is noted below:  Filled  Written  Drug  QTY  Days  Prescriber    04/09/2025 04/09/2025 Buprenorphine 8 Mg Tablet Sl 60.00 30 [facility]   03/12/2025 03/12/2025 Buprenorphine 8 Mg Tablet Sl 60.00 30 [facility]   03/12/2025 01/03/2025 Gabapentin 600 Mg Tablet 60.00 30 An [facility]   02/07/2025 01/03/2025 Gabapentin 600 Mg Tablet 60.00 30 An [facility]   02/06/2025 02/06/2025 Buprenorphine 8 Mg Tablet Sl 60.00 30 [facility]   01/13/2025 01/13/2025 Buprenorphine 8 Mg Tablet Sl 60.00 30 [facility]   01/03/2025 01/03/2025 Gabapentin 600 Mg Tablet 60.00 30 An [facility]   12/18/2024 12/18/2024 Gabapentin 600 Mg Tablet 56.00 28 [facility]   12/17/2024 11/20/2024 Buprenorphine 8 Mg Tablet Sl 44.00 22 [facility]   11/20/2024 10/23/2024 Buprenorphine 8 Mg Tablet Sl 56.00 28 [facility]   10/29/2024 10/23/2024 Buprenorphine 8 Mg Tablet Sl 44.00 22 [facility]   10/26/2024 10/23/2024 Buprenorphine 8 Mg Tablet Sl 6.00 3 [facility]   10/23/2024 10/23/2024 Buprenorphine 8 Mg Tablet Sl 6.00 3 [facility]   10/11/2024 10/09/2024 Buprenorphine 8 Mg Tablet Sl 30.00 15 [facility]   10/03/2024 10/01/2024 Buprenorphine 8 Mg Tablet Sl 16.00 8 [facility]              Assessment / Plan     F11.20  Opioid use disorder, severe, on maintenance therapy  F 15.20  Methamphetamine use disorder, moderate  F29   Psychosis, unspecified psychosis type  N52.9   Erectile dysfunction, unspecified erectile dysfunction type  G47.00  Insomnia, unspecified type  F39   Mood disorder    Assessed substance use disorder, focusing on heroin and methamphetamine use history.  Evaluated psychosis symptoms, likely secondary to methamphetamine-induced psychosis and prolonged use.  Discussed the dangers of continued  methamphetamine use.  Increased Subutex to 24 mg per day to address continued cravings and reduce risk of relapse.  Considered future psychiatric evaluation if mood or psychosis issues persist despite methamphetamine abstinence.  Will continue to recommend patient establish with PCP.   Plan to reassess hepatitis C status if unexplained symptoms occur.    PLAN:  Will discuss getting collateral history at next encounter.  Continue Zyprexa 10 mg  Increase Seroquel to 50 mg since patient reports not always taking his Zyprexa but does take his Seroquel.  Pending symptoms and lab work will consider discontinuing Zyprexa in the future and increasing Seroquel.  Increase Subutex to 24 mg daily  Continue Gabapentin 600 mg BID for now.  Start Viagra 100 mg as needed for erectile dysfunction  Order comprehensive lab workup for next visit, including metabolic panel, liver function tests, triglycerides, and urine drug screen. Will also get EKG.  Discuss potential for long-acting injectable buprenorphine at future appointments                     KINJAL Archibald DO    Board Certified, Addiction Medicine  Board Certified, Neuromusculoskeletal Medicine    Portions of this documentation may have been dictated using voice recognition software and may contain dictation related errors in spelling / grammar / syntax not discovered on text review.         Initial patient encounter.  Clinic encounter.  05048: Office E&M of an NEW patient, level 4, Level 4 criteria: 2 stable chronic illnesses addressed and Prescription Drug Management  Added Complexity: Visit today included increased complexity associated with the care of the episodic problem(s) addressed and managing the longitudinal care of the patient due to the serious and/or complex managed problem(s).

## 2025-06-12 ENCOUNTER — TELEPHONE (OUTPATIENT)
Facility: CLINIC | Age: 45
End: 2025-06-12
Payer: MEDICAID

## 2025-06-12 NOTE — TELEPHONE ENCOUNTER
----- Message from KINJAL Archibald DO sent at 5/29/2025  1:26 PM CDT -----  Regarding: Labs and EKG  Patient has lab work and EKG ordered to get before his next appointment. Can someone contact him and see if he can get them done prior to his appointment.Thanks

## 2025-06-12 NOTE — TELEPHONE ENCOUNTER
Called and left voicemail letting Yared know that he needs to have an EKG done and labs before his next appointment on June 26, 2025.

## 2025-06-26 ENCOUNTER — OFFICE VISIT (OUTPATIENT)
Facility: CLINIC | Age: 45
End: 2025-06-26
Payer: MEDICAID

## 2025-06-26 VITALS
HEART RATE: 86 BPM | DIASTOLIC BLOOD PRESSURE: 94 MMHG | SYSTOLIC BLOOD PRESSURE: 151 MMHG | BODY MASS INDEX: 29.8 KG/M2 | WEIGHT: 244.69 LBS | HEIGHT: 76 IN

## 2025-06-26 DIAGNOSIS — F29 PSYCHOSIS, UNSPECIFIED PSYCHOSIS TYPE: ICD-10-CM

## 2025-06-26 DIAGNOSIS — F15.20 METHAMPHETAMINE USE DISORDER, MODERATE: ICD-10-CM

## 2025-06-26 DIAGNOSIS — F39 MOOD DISORDER: ICD-10-CM

## 2025-06-26 DIAGNOSIS — F11.20 OPIOID USE DISORDER, SEVERE, ON MAINTENANCE THERAPY: ICD-10-CM

## 2025-06-26 DIAGNOSIS — G47.00 INSOMNIA, UNSPECIFIED TYPE: ICD-10-CM

## 2025-06-26 DIAGNOSIS — K40.90 UNILATERAL INGUINAL HERNIA WITHOUT OBSTRUCTION OR GANGRENE, RECURRENCE NOT SPECIFIED: Primary | ICD-10-CM

## 2025-06-26 PROCEDURE — 3044F HG A1C LEVEL LT 7.0%: CPT | Mod: CPTII,,, | Performed by: NEUROMUSCULOSKELETAL MEDICINE & OMM

## 2025-06-26 PROCEDURE — 1160F RVW MEDS BY RX/DR IN RCRD: CPT | Mod: CPTII,,, | Performed by: NEUROMUSCULOSKELETAL MEDICINE & OMM

## 2025-06-26 PROCEDURE — 99999 PR PBB SHADOW E&M-EST. PATIENT-LVL III: CPT | Mod: PBBFAC,,, | Performed by: NEUROMUSCULOSKELETAL MEDICINE & OMM

## 2025-06-26 PROCEDURE — 99214 OFFICE O/P EST MOD 30 MIN: CPT | Mod: S$PBB,,, | Performed by: NEUROMUSCULOSKELETAL MEDICINE & OMM

## 2025-06-26 PROCEDURE — 3080F DIAST BP >= 90 MM HG: CPT | Mod: CPTII,,, | Performed by: NEUROMUSCULOSKELETAL MEDICINE & OMM

## 2025-06-26 PROCEDURE — 99213 OFFICE O/P EST LOW 20 MIN: CPT | Mod: PBBFAC,PN | Performed by: NEUROMUSCULOSKELETAL MEDICINE & OMM

## 2025-06-26 PROCEDURE — 3008F BODY MASS INDEX DOCD: CPT | Mod: CPTII,,, | Performed by: NEUROMUSCULOSKELETAL MEDICINE & OMM

## 2025-06-26 PROCEDURE — 3077F SYST BP >= 140 MM HG: CPT | Mod: CPTII,,, | Performed by: NEUROMUSCULOSKELETAL MEDICINE & OMM

## 2025-06-26 PROCEDURE — G2211 COMPLEX E/M VISIT ADD ON: HCPCS | Mod: ,,, | Performed by: NEUROMUSCULOSKELETAL MEDICINE & OMM

## 2025-06-26 PROCEDURE — 1159F MED LIST DOCD IN RCRD: CPT | Mod: CPTII,,, | Performed by: NEUROMUSCULOSKELETAL MEDICINE & OMM

## 2025-06-26 RX ORDER — BUPRENORPHINE HYDROCHLORIDE 8 MG/1
8 TABLET SUBLINGUAL 3 TIMES DAILY
Qty: 84 TABLET | Refills: 0 | Status: SHIPPED | OUTPATIENT
Start: 2025-06-26 | End: 2025-07-24

## 2025-06-26 RX ORDER — OLANZAPINE 10 MG/1
10 TABLET, FILM COATED ORAL NIGHTLY
Qty: 30 TABLET | Refills: 0 | Status: SHIPPED | OUTPATIENT
Start: 2025-06-26 | End: 2026-06-26

## 2025-06-26 RX ORDER — LITHIUM CARBONATE 450 MG/1
450 TABLET ORAL EVERY 12 HOURS
Qty: 60 TABLET | Refills: 11 | Status: SHIPPED | OUTPATIENT
Start: 2025-06-26 | End: 2026-06-26

## 2025-06-26 NOTE — TELEPHONE ENCOUNTER
Pt came to door asking If we could ask Dr. Archibald to refill 2 medications for him. I see one is pending approval, but I tried calling patient to double check what meds are needed but numbers on file no one answers. Or his phone has restrictions. Moms new number went straight to voicemail .

## 2025-06-26 NOTE — PROGRESS NOTES
"     Addiction Medicine  ESTABLISHED PATIENT EVALUATION    Date of Service: 2025    Name:  Yared Portillo   MRN:  [id number]   :  1980   Address:  [address] [place] [address]   Phone:  [phone]        Subjective     2025     History of Present Illness    Patient presents today for follow-up of opioid use disorder and methamphetamine use disorder. He is currently in treatment at [facility] in [place], [place], with two weeks remaining before graduation. He is considering sober living after treatment completion. He denies methamphetamine use in the past month. He takes Subutex 8 mg 3 times daily and reports no cravings. While interested in transitioning to long-acting injectable Subutex, he is currently hesitant to change. He plans to continue regular follow-up with me and attending AA/NA meetings post-treatment. He reports no current episodes of psychosis, noting that past psychotic events were exclusively related to methamphetamine use. He reports feeling well on his current psychiatric medications which include Zyprexa, Seroquel, and lithium. He reports a right inguinal hernia that occurred months ago while lifting a refrigerator. The hernia has progressively enlarged and is now fully descended into the right scrotum. He denies evidence of hernia entrapment or gangrene. Labs were drawn at [facility], results pending.             Objective     Vitals:    25 0853   BP: (!) 151/94   BP Location: Right arm   Patient Position: Sitting   Pulse: 86   Weight: 111 kg (244 lb 11.4 oz)   Height: 6' 4" (1.93 m)        Physical Exam:  No acute distress. Normal appearance. No scleral icterus. Conjunctivae normal. Pulmonary effort is normal. Nor respiratory distress. Alert and oriented x 3. Attention and perception appear normal. Mood and affect appear normal. Speech normal. Patient is cooperative.  Inguinal hernia descended into scrotum on the left. No " strangulation noted.     Assessment / Plan     F11.20 Opioid use disorder, severe, on maintenance therapy  F15.20 Methamphetamine use disorder, moderate  F29 Psychosis, unspecified psychosis type  K40.90 Unilateral inguinal hernia without obstruction or gangrene, recurrence not specified  F39 Mood disorder  G47.00 Insomnia, unspecified type    Assessed progress in opioid use disorder and methamphetamine use disorder treatment.  Evaluated current medication regimen (Zyprexa, Seroquel, lithium, Subutex) for effectiveness in managing symptoms and cravings.  Noted no recent methamphetamine use or psychotic episodes.  Considered transition from Subutex to long-acting injectable version, but patient hesitant to change at this time.  Discussed potential need for opioid management and temporary opioid full agonist therapy if hernia surgery is required.  Recognized importance of continued recovery environment post-treatment.    PLAN:  Labs next visit if not received from treatment center  EKG next visit  Referral to general surgery for right inguinal hernia evaluation  Request treatment center ([facility]) to fax recent lab results  Continue Subutex 8 mg 3 times daily  Continue Zyprexa, Lithium, and Seroquel  Continue attending NA/AA meetings  Follow up in 4 weeks              KINJAL Archibald DO    Board Certified, Addiction Medicine  Board Certified, Neuromusculoskeletal Medicine and OMM    Portions of this documentation may have been dictated using voice recognition software and may contain dictation related errors in spelling / grammar / syntax not discovered on text review.          Subsequent patient encounter.  Clinic encounter.  64320: Office E&M of an ESTABLISHED patient, level 4, Level 4 criteria: 1 unstable chronic illness and Prescription Drug Management  Added Complexity: Visit today included increased complexity associated with the care of the episodic problem(s) addressed and managing the longitudinal care of the  patient due to the serious and/or complex managed problem(s).

## 2025-07-25 ENCOUNTER — OFFICE VISIT (OUTPATIENT)
Facility: CLINIC | Age: 45
End: 2025-07-25
Payer: MEDICAID

## 2025-07-25 VITALS
HEART RATE: 70 BPM | BODY MASS INDEX: 30.98 KG/M2 | DIASTOLIC BLOOD PRESSURE: 88 MMHG | WEIGHT: 254.44 LBS | SYSTOLIC BLOOD PRESSURE: 135 MMHG | HEIGHT: 76 IN

## 2025-07-25 DIAGNOSIS — R10.31 POSTOPERATIVE PAIN, ACUTE, GROIN, RIGHT: ICD-10-CM

## 2025-07-25 DIAGNOSIS — F15.20 METHAMPHETAMINE USE DISORDER, MODERATE: ICD-10-CM

## 2025-07-25 DIAGNOSIS — F39 MOOD DISORDER: ICD-10-CM

## 2025-07-25 DIAGNOSIS — F11.20 OPIOID USE DISORDER, SEVERE, ON MAINTENANCE THERAPY: Primary | ICD-10-CM

## 2025-07-25 DIAGNOSIS — F29 PSYCHOSIS, UNSPECIFIED PSYCHOSIS TYPE: ICD-10-CM

## 2025-07-25 DIAGNOSIS — G89.18 POSTOPERATIVE PAIN, ACUTE, GROIN, RIGHT: ICD-10-CM

## 2025-07-25 DIAGNOSIS — G47.00 INSOMNIA, UNSPECIFIED TYPE: ICD-10-CM

## 2025-07-25 LAB
AMP AMPHETAMINE 1000 NM/ML POC: NEGATIVE
BAR BARBITURATES 300 NG/ML POC: NEGATIVE
BUP BUPRENORPHINE 10 NG/ML POC: ABNORMAL
BZO BENZODIAZEPINES 300 NG/ML POC: NEGATIVE
COC COCAINE 300 NG/ML POC: NEGATIVE
CREATININE (CR) POC: NEGATIVE
CTP QC/QA: YES
MET METHAMPHETAMINE 1000 NG/ML POC: NEGATIVE
MOP/OPI300 MORPHINE 300 NG/ML POC: NEGATIVE
MTD METHADONE 300 NG/ML POC: NEGATIVE
OXIDANT (OX) POC: NEGATIVE
OXY OXYCODONE 100 NG/ML POC: NEGATIVE
SPECIFIC GRAVITY (SG) POC: 1.02
TEMPERATURE (°F) POC: 90
THC MARIJUANA 50 NG/ML POC: NEGATIVE

## 2025-07-25 PROCEDURE — 99999 PR PBB SHADOW E&M-EST. PATIENT-LVL III: CPT | Mod: PBBFAC,,, | Performed by: NEUROMUSCULOSKELETAL MEDICINE & OMM

## 2025-07-25 PROCEDURE — 99213 OFFICE O/P EST LOW 20 MIN: CPT | Mod: PBBFAC,PN | Performed by: NEUROMUSCULOSKELETAL MEDICINE & OMM

## 2025-07-25 RX ORDER — OXYCODONE AND ACETAMINOPHEN 10; 325 MG/1; MG/1
1 TABLET ORAL EVERY 6 HOURS PRN
Qty: 12 TABLET | Refills: 0 | Status: SHIPPED | OUTPATIENT
Start: 2025-07-29 | End: 2025-08-01

## 2025-07-25 RX ORDER — OLANZAPINE 10 MG/1
10 TABLET, FILM COATED ORAL NIGHTLY
Qty: 30 TABLET | Refills: 11 | Status: SHIPPED | OUTPATIENT
Start: 2025-07-25 | End: 2026-07-25

## 2025-07-25 RX ORDER — BUPRENORPHINE HYDROCHLORIDE 8 MG/1
8 TABLET SUBLINGUAL 3 TIMES DAILY
Qty: 90 TABLET | Refills: 0 | Status: SHIPPED | OUTPATIENT
Start: 2025-07-25 | End: 2025-08-24
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
## 2025-08-07 DIAGNOSIS — N52.9 ERECTILE DYSFUNCTION, UNSPECIFIED ERECTILE DYSFUNCTION TYPE: ICD-10-CM

## 2025-08-07 RX ORDER — SILDENAFIL 100 MG/1
100 TABLET, FILM COATED ORAL DAILY PRN
Qty: 30 TABLET | Refills: 0 | OUTPATIENT
Start: 2025-08-07 | End: 2026-08-07

## 2025-08-21 ENCOUNTER — OFFICE VISIT (OUTPATIENT)
Facility: CLINIC | Age: 45
End: 2025-08-21
Payer: MEDICAID

## 2025-08-21 VITALS
WEIGHT: 259.13 LBS | DIASTOLIC BLOOD PRESSURE: 84 MMHG | HEART RATE: 95 BPM | HEIGHT: 76 IN | BODY MASS INDEX: 31.56 KG/M2 | SYSTOLIC BLOOD PRESSURE: 122 MMHG

## 2025-08-21 DIAGNOSIS — G47.00 INSOMNIA, UNSPECIFIED TYPE: ICD-10-CM

## 2025-08-21 DIAGNOSIS — F29 PSYCHOSIS, UNSPECIFIED PSYCHOSIS TYPE: ICD-10-CM

## 2025-08-21 DIAGNOSIS — F11.20 OPIOID USE DISORDER, SEVERE, ON MAINTENANCE THERAPY: Primary | ICD-10-CM

## 2025-08-21 DIAGNOSIS — F15.20 METHAMPHETAMINE USE DISORDER, MODERATE: ICD-10-CM

## 2025-08-21 DIAGNOSIS — F39 MOOD DISORDER: ICD-10-CM

## 2025-08-21 PROCEDURE — 99999 PR PBB SHADOW E&M-EST. PATIENT-LVL III: CPT | Mod: PBBFAC,,, | Performed by: NEUROMUSCULOSKELETAL MEDICINE & OMM

## 2025-08-21 PROCEDURE — G2211 COMPLEX E/M VISIT ADD ON: HCPCS | Mod: ,,, | Performed by: NEUROMUSCULOSKELETAL MEDICINE & OMM

## 2025-08-21 PROCEDURE — 99214 OFFICE O/P EST MOD 30 MIN: CPT | Mod: S$PBB,,, | Performed by: NEUROMUSCULOSKELETAL MEDICINE & OMM

## 2025-08-21 PROCEDURE — 3079F DIAST BP 80-89 MM HG: CPT | Mod: CPTII,,, | Performed by: NEUROMUSCULOSKELETAL MEDICINE & OMM

## 2025-08-21 PROCEDURE — 3074F SYST BP LT 130 MM HG: CPT | Mod: CPTII,,, | Performed by: NEUROMUSCULOSKELETAL MEDICINE & OMM

## 2025-08-21 PROCEDURE — 99213 OFFICE O/P EST LOW 20 MIN: CPT | Mod: PBBFAC,PN | Performed by: NEUROMUSCULOSKELETAL MEDICINE & OMM

## 2025-08-21 PROCEDURE — 1160F RVW MEDS BY RX/DR IN RCRD: CPT | Mod: CPTII,,, | Performed by: NEUROMUSCULOSKELETAL MEDICINE & OMM

## 2025-08-21 PROCEDURE — 1159F MED LIST DOCD IN RCRD: CPT | Mod: CPTII,,, | Performed by: NEUROMUSCULOSKELETAL MEDICINE & OMM

## 2025-08-21 PROCEDURE — 3044F HG A1C LEVEL LT 7.0%: CPT | Mod: CPTII,,, | Performed by: NEUROMUSCULOSKELETAL MEDICINE & OMM

## 2025-08-21 PROCEDURE — 3008F BODY MASS INDEX DOCD: CPT | Mod: CPTII,,, | Performed by: NEUROMUSCULOSKELETAL MEDICINE & OMM

## 2025-08-21 RX ORDER — QUETIAPINE FUMARATE 50 MG/1
50 TABLET, FILM COATED ORAL NIGHTLY
Qty: 30 TABLET | Refills: 11 | Status: SHIPPED | OUTPATIENT
Start: 2025-08-21 | End: 2026-08-21

## 2025-08-21 RX ORDER — OLANZAPINE 10 MG/1
10 TABLET, FILM COATED ORAL NIGHTLY
Qty: 30 TABLET | Refills: 11 | Status: SHIPPED | OUTPATIENT
Start: 2025-08-21 | End: 2026-08-21

## 2025-08-21 RX ORDER — BUPRENORPHINE HYDROCHLORIDE 8 MG/1
8 TABLET SUBLINGUAL 3 TIMES DAILY
Qty: 90 TABLET | Refills: 0 | Status: SHIPPED | OUTPATIENT
Start: 2025-08-21 | End: 2025-09-21